# Patient Record
Sex: MALE | URBAN - METROPOLITAN AREA
[De-identification: names, ages, dates, MRNs, and addresses within clinical notes are randomized per-mention and may not be internally consistent; named-entity substitution may affect disease eponyms.]

---

## 2017-01-12 ENCOUNTER — EMERGENCY (EMERGENCY)
Facility: HOSPITAL | Age: 22
LOS: 1 days | Discharge: HOME | End: 2017-01-12
Attending: EMERGENCY MEDICINE | Admitting: EMERGENCY MEDICINE
Payer: MEDICAID

## 2017-01-12 VITALS
HEART RATE: 108 BPM | RESPIRATION RATE: 18 BRPM | DIASTOLIC BLOOD PRESSURE: 84 MMHG | TEMPERATURE: 99 F | SYSTOLIC BLOOD PRESSURE: 149 MMHG | OXYGEN SATURATION: 100 %

## 2017-01-12 VITALS
SYSTOLIC BLOOD PRESSURE: 148 MMHG | DIASTOLIC BLOOD PRESSURE: 75 MMHG | HEART RATE: 80 BPM | RESPIRATION RATE: 16 BRPM | OXYGEN SATURATION: 100 % | TEMPERATURE: 99 F

## 2017-01-12 PROCEDURE — 93010 ELECTROCARDIOGRAM REPORT: CPT

## 2017-01-12 PROCEDURE — 99053 MED SERV 10PM-8AM 24 HR FAC: CPT

## 2017-01-12 PROCEDURE — 71020: CPT | Mod: 26

## 2017-01-12 PROCEDURE — 99284 EMERGENCY DEPT VISIT MOD MDM: CPT | Mod: 25

## 2017-01-12 RX ORDER — IBUPROFEN 200 MG
400 TABLET ORAL ONCE
Qty: 0 | Refills: 0 | Status: COMPLETED | OUTPATIENT
Start: 2017-01-12 | End: 2017-01-12

## 2017-01-12 RX ADMIN — Medication 400 MILLIGRAM(S): at 04:58

## 2017-01-12 NOTE — ED PROVIDER NOTE - ATTENDING CONTRIBUTION TO CARE
DR. DE SANTIAGO, ATTENDING MD-  I performed a face to face bedside interview with patient regarding history of present illness, review of symptoms and past medical history. I completed an independent physical exam.  I have discussed patient's plan of care with the resident.   Documentation as above in the note.   HPI: 20 yo M with no Past Medical History that presents with chest pain s/p smoking marijuana tonight. No other complaints, no body else smoked same MJ, denies any other intake including cocaine. Symptoms already improving while waiting in [facility]. No fam history of early MI.   EXAM: Cardiac RRR, no murmurs, rubs, gallops. Lung CTAB. Eye injected conjunctiva.   MDM: CXR read clear, EKG NSR sinus tachy. Most likely effects MJ. Will send home with rec to stop smoking MJ, return for worsening symptoms although pt already asymptomatic.

## 2017-01-12 NOTE — ED PROVIDER NOTE - PLAN OF CARE
Follow up with your primary medical doctor.  Drink plenty of fluids.  Return to ED for any increasing pain, shortness of breath, nausea or vomiting, or increasing chest discomfort or any new or worsening or concerning symptoms.

## 2017-01-12 NOTE — ED PROVIDER NOTE - OBJECTIVE STATEMENT
22 yo M no sig pmh arrived after smoking marijuana with chest discomfort nausea and palpitations.  Patient reported smoking earlier tonight and was noted to have increasing chest pressure today.  Noted to have slight shortness of breath earlier tonight.  Able to ambulate with no increasing chest discomfort.  Currently reports slight discomfort and no pain in the chest.  Patient feels close to normal at this point.  Patient denies vomiting, abdominal pain, neck pain, shoulder pain.

## 2017-01-12 NOTE — ED ADULT NURSE NOTE - OBJECTIVE STATEMENT
Pt rec'd A&ox3 c/o of rt shoulder pain. pt admits to smoking marijuana tonight. Pt appears in NAD. Denies SOB, n/v/d. Plan is for xray. MD evaluated

## 2017-01-12 NOTE — ED PROVIDER NOTE - CARE PLAN
Principal Discharge DX:	Palpitations Principal Discharge DX:	Palpitations  Instructions for follow-up, activity and diet:	Follow up with your primary medical doctor.  Drink plenty of fluids.  Return to ED for any increasing pain, shortness of breath, nausea or vomiting, or increasing chest discomfort or any new or worsening or concerning symptoms.

## 2017-01-12 NOTE — ED ADULT NURSE NOTE - CHIEF COMPLAINT QUOTE
pt. states he was walking up the stairs, hyperextended his right arm and now reports "heaviness" to the R shoulder, back pain and pain across the chest.  Pt. able to move both upper extremities w/out difficulty.  Denies PMHx.

## 2017-01-12 NOTE — ED PROVIDER NOTE - MEDICAL DECISION MAKING DETAILS
22 yo M a/w chest discomfort after smoking marijuana; unlikely cardiac, now resolved, HEART score < 3  - ekg, chest xray  - discuss abstaining from marijuana and possible cross contam with other illicit substances.

## 2017-01-12 NOTE — ED ADULT TRIAGE NOTE - CHIEF COMPLAINT QUOTE
pt. states he was walking up the stairs, hyperextended his right arm and now reports "heaviness" to the R shoulder and back pain.  Pt. able to move both upper extremities w/out difficulty.  Denies PMHx. pt. states he was walking up the stairs, hyperextended his right arm and now reports "heaviness" to the R shoulder, back pain and pain across the chest.  Pt. able to move both upper extremities w/out difficulty.  Denies PMHx.

## 2017-01-14 ENCOUNTER — EMERGENCY (EMERGENCY)
Facility: HOSPITAL | Age: 22
LOS: 1 days | Discharge: LEFT BEFORE DISCHARGE | End: 2017-01-14
Admitting: EMERGENCY MEDICINE

## 2017-01-14 VITALS
SYSTOLIC BLOOD PRESSURE: 122 MMHG | DIASTOLIC BLOOD PRESSURE: 80 MMHG | RESPIRATION RATE: 18 BRPM | HEIGHT: 75 IN | HEART RATE: 87 BPM | WEIGHT: 283.07 LBS | OXYGEN SATURATION: 100 % | TEMPERATURE: 98 F

## 2017-01-14 VITALS
SYSTOLIC BLOOD PRESSURE: 123 MMHG | RESPIRATION RATE: 16 BRPM | OXYGEN SATURATION: 100 % | HEART RATE: 84 BPM | DIASTOLIC BLOOD PRESSURE: 79 MMHG | TEMPERATURE: 98 F

## 2017-01-14 NOTE — ED ADULT NURSE NOTE - CHIEF COMPLAINT QUOTE
Pt c/o CP x 3 days which pt states worsens with inspirations. Pt was seen in [facility] ER for same on Wednesday. Pt denies nausea/vomiting/SOB.

## 2017-01-14 NOTE — ED ADULT TRIAGE NOTE - CHIEF COMPLAINT QUOTE
Pt c/o CP x 3 days which pt states worsens with inspirations. Pt was seen in [facility] ER for same on Wednesday. Pt denies nausea/vomiting/SOB.

## 2017-02-24 ENCOUNTER — EMERGENCY (EMERGENCY)
Facility: HOSPITAL | Age: 22
LOS: 1 days | Discharge: HOME | End: 2017-02-24
Attending: EMERGENCY MEDICINE | Admitting: EMERGENCY MEDICINE
Payer: MEDICAID

## 2017-02-24 VITALS
HEART RATE: 72 BPM | DIASTOLIC BLOOD PRESSURE: 71 MMHG | OXYGEN SATURATION: 100 % | HEIGHT: 75 IN | SYSTOLIC BLOOD PRESSURE: 125 MMHG | TEMPERATURE: 99 F | WEIGHT: 315 LBS

## 2017-02-24 PROCEDURE — 93010 ELECTROCARDIOGRAM REPORT: CPT

## 2017-02-24 PROCEDURE — 71020: CPT | Mod: 26

## 2017-02-24 PROCEDURE — 99284 EMERGENCY DEPT VISIT MOD MDM: CPT | Mod: 25

## 2017-02-24 RX ORDER — DIPHENOXYLATE HCL/ATROPINE 2.5-.025MG
1 TABLET ORAL
Qty: 20 | Refills: 0 | OUTPATIENT
Start: 2017-02-24

## 2017-02-24 RX ORDER — FAMOTIDINE 10 MG/ML
1 INJECTION INTRAVENOUS
Qty: 20 | Refills: 0 | OUTPATIENT
Start: 2017-02-24 | End: 2017-03-16

## 2017-02-24 NOTE — ED PROVIDER NOTE - PLAN OF CARE
You were seen today for your reflux and diarrhea.  Take the prescribed antacids and antidiarrheals as needed.  Be sure to follow up with your GI doctor and cardiologist as previously scheduled.  RETURN TO THE EMERGENCY DEPARTMENT IMMEDIATELY FOR SEVERE PAIN, TROUBLE BREATHING, INABILITY TO EAT OR DRINK, OR FOR ANY OTHER CONCERN.

## 2017-02-24 NOTE — ED PROVIDER NOTE - MEDICAL DECISION MAKING DETAILS
Pt well appearing with chronic complaints, in process of arranging appropriate outpatient followup. EKG unchanged from previous. Will obtain CXR, give Toradol/Pepcid/Maalox and d/c if CXR unchanged.

## 2017-02-24 NOTE — ED PROVIDER NOTE - OBJECTIVE STATEMENT
21M with no pmh p/w CP x 1 month. Reports 4 weeks of intermittent generalized chest discomfort with some associated SOB. Also endorses intermittent abd cramping with some diarrhea. Has seen PMD and seen in ED for this and had negative CXR, EKG, blood work but sx persisted. Pt in process of scheduling GI and cards outpt appointments. States CP worse with sitting. Denies vomiting, fever.

## 2017-02-24 NOTE — ED PROVIDER NOTE - ATTENDING CONTRIBUTION TO CARE
DR. KIM, ATTENDING MD-  I performed a face to face bedside interview with patient regarding history of present illness, review of symptoms and past medical history. I completed an independent physical exam.  I have discussed patient's plan of care with the resident.   Documentation as above in the note.    22 y/o male with chronic cp, likely secondary to reflux as pain is burning worse when eating.  Diarrhea 2-3 episodes daily.  His pcp has worked him up for these sx and had neg w/u, due for GI f/u and cards f/u in next 2-3 weeks.  Denies f/c, ha, neck stiffness, sob, cough,  n/v, dysuria, rash.  Afebrile, vs wnl, nad, ctabil, s1s2 rrr no m/r/g, abd soft non ttp no r/g, no cva tenderness b/l, no leg swelling b/l, no rash.  Eval for ptx, pna.  EKG shows nsr without st or tw changes.  Likely reflux sx.  Obtain cxr.  Antic d/c with antidiarrhea, antacids, advise keep specialty f/u appts.

## 2017-02-24 NOTE — ED PROVIDER NOTE - CARE PLAN
Principal Discharge DX:	GERD (gastroesophageal reflux disease)  Instructions for follow-up, activity and diet:	You were seen today for your reflux and diarrhea.  Take the prescribed antacids and antidiarrheals as needed.  Be sure to follow up with your GI doctor and cardiologist as previously scheduled.  RETURN TO THE EMERGENCY DEPARTMENT IMMEDIATELY FOR SEVERE PAIN, TROUBLE BREATHING, INABILITY TO EAT OR DRINK, OR FOR ANY OTHER CONCERN.  Secondary Diagnosis:	Diarrhea

## 2017-02-24 NOTE — ED ADULT TRIAGE NOTE - CHIEF COMPLAINT QUOTE
Pt with c/o of intermittent chest pain for one month and states having diarrhea with abdominal pain.Was seen by his PMD but states pain continues.

## 2021-11-26 ENCOUNTER — TRANSCRIPTION ENCOUNTER (OUTPATIENT)
Age: 26
End: 2021-11-26

## 2025-04-08 NOTE — ED ADULT NURSE NOTE - FINAL NURSING ELECTRONIC SIGNATURE
No
Pt. BIBA for abdominal pain x2 hours. Pt. abdomen distended and hard on arrival. Pt. BP in field 60/40s. Unable to obtain  BP on both arms  in triage. Pt. has hx of spina bifida. Pt. is A&Ox4 in triage. +2 radial pulses. IVF started by EMS. hx of  shunt. Pt. brought to [facility]. MD Cruz and MD Jacobs at bedside.
12-Jan-2017 04:56